# Patient Record
Sex: MALE | Race: WHITE | URBAN - METROPOLITAN AREA
[De-identification: names, ages, dates, MRNs, and addresses within clinical notes are randomized per-mention and may not be internally consistent; named-entity substitution may affect disease eponyms.]

---

## 2020-03-20 ENCOUNTER — OFFICE VISIT CONVERTED (OUTPATIENT)
Dept: UROLOGY | Facility: CLINIC | Age: 20
End: 2020-03-20
Attending: NURSE PRACTITIONER

## 2020-03-20 ENCOUNTER — HOSPITAL ENCOUNTER (OUTPATIENT)
Dept: UROLOGY | Facility: CLINIC | Age: 20
Discharge: HOME | End: 2020-03-20
Attending: NURSE PRACTITIONER

## 2020-03-20 LAB
C TRACH RRNA CVX QL NAA+PROBE: NOT DETECTED
N GONORRHOEA DNA SPEC QL NAA+PROBE: NOT DETECTED

## 2020-07-16 ENCOUNTER — OFFICE VISIT CONVERTED (OUTPATIENT)
Dept: UROLOGY | Facility: CLINIC | Age: 20
End: 2020-07-16
Attending: NURSE PRACTITIONER

## 2021-05-13 NOTE — PROGRESS NOTES
Progress Note      Patient Name: Serafin Talamantes   Patient ID: [id number]   Sex: Male   YOB: 2000    Primary Care Provider: Kacy SAINZ    Visit Date: July 16, 2020    Provider: EMELI Wu   Location: [facility]   Location Address: [address] [place] [address], [address]  [place], [place]  [id number]   Location Phone: [phone]          Chief Complaint  · follow up       History Of Present Illness  The patient is a 19 year old /White male , who is here to follow up on frequency and voiding issues. He is here with his mother who has a history of IC. Patient states he is better after antibiotic but has noticed having frequency still when at work. Denies incontinence but has discomfort in distal penis if urge to urinate and has to urinate in order to relieve. Voids every 30 minutes at times. He doesn't seem to have as much of an issue at home. Problems only notice in the past year and admits having problems with anxiety.          stevenson/anette neg. 3/20/20      has family history of ic in mom       Past Medical History  Dysuria         Past Surgical History  Tonsillectomy and adenoidectomy in patient age 12 or over         Allergy List  NO KNOWN DRUG ALLERGIES       Allergies Reconciled  Family Medical History  Hypertension; Cystitis (Chronic); Prostate cancer         Social History  Alcohol; Tobacco (Never)         Review of Systems  · Constitutional  o Denies  o : fever, headache, chills  · Eyes  o Denies  o : eye pain, double vision, blurred vision  · HENT  o Denies  o : sinus problems, sore throat, ear infection  · Cardiovascular  o Denies  o : chest pain, high blood pressure, varicosities  · Respiratory  o Denies  o : shortness of breath, wheezing, frequent cough  · Gastrointestinal  o Denies  o : nausea, vomiting, heartburn, indigestion, abdominal pain  · Genitourinary  o Admits  o : frequency, difficulty voiding at times  o Denies  o : urgency, urinary  "retention, painful urination  · Integument  o Denies  o : rash, itching, boils  · Neurologic  o Denies  o : tingling or numbness, tremors, dizzy spells  · Musculoskeletal  o Denies  o : joint pain, neck pain, back pain  · Endocrine  o Denies  o : cold intolerance, heat intolerance, tired, excessive thirst, sluggish  · Psychiatric  o Admits  o : feels satisfied with life  o Denies  o : severe depression, concerns with hurting themselves  · Heme-Lymph  o Denies  o : swollen glands, blood clotting problems  · Allergic-Immunologic  o Denies  o : sinus allergy symptoms, hay fever      Vitals  Date Time BP Position Site L\R Cuff Size HR RR TEMP (F) WT  HT  BMI kg/m2 BSA m2 O2 Sat HC       07/16/2020 10:31 /64 Sitting    71 - R   214lbs 16oz 6'  2\" 27.6 2.26           Physical Examination  · Constitutional  o Appearance  o : Well nourished, well developed patient in no acute distress. Ambulating without difficulty.  · Respiratory  o Respiratory Effort  o : Breathing is unlabored without accessory muscle use  o Inspection of Chest  o : normal appearance, no retractions  o Auscultation of Lungs  o : normal breath sounds throughout  · Cardiovascular  o Heart  o :   § Auscultation of Heart  § : regular rate and rhythm, no murmurs, gallops or rubs  o Peripheral Vascular System  o : No abnormalities  · Gastrointestinal  o Abdominal Examination  o : Scaphoid abdomen which is non-tender to palpation with normal tone and without rigidity or guarding. Normal bowel sounds. No masses present.  · Genitourinary  o Bladder  o : no abnormalities  o Penis  o : no lesions, circumcised meatal stenosis  o Urethral Meatus  o : no abnormalities  o Scrotum and Scrotal Contents  o :   § Scrotum  § : no abnormalities  § Epididymides  § : no abnormalities  § Testes  § : no abnormalities  · Lymphatic  o Groin  o : No lymphadenopathy present  · Skin and Subcutaneous Tissue  o General Inspection  o : No rashes, lesions or areas of discoloration " present. Skin turgor is normal.  · Neurologic  o Mental Status Examination  o : grossly oriented to person, place and time  o Gait and Station  o : normal gait, able to stand without difficulty  · Psychiatric  o Mood and Affect  o : mood normal, affect appropriate      Figure 1.0: Pain Rating Scale-Estefany         Results  · In-Office Procedures  o Lab procedure  § Automated dipstick urinalysis with microscopy (13982)   § Color Ur: Yellow   § Clarity Ur: Clear   § Glucose Ur Ql Strip: Negative   § Bilirub Ur Ql Strip: Negative   § Ketones Ur Ql Strip: Negative   § Sp Gr Ur Qn: greater than 1.030   § Hgb Ur Ql Strip: Trace-Intact   § pH Ur-LsCnc: 6.0   § Prot Ur Ql Strip: Negative   § Urobilinogen Ur Strip-mCnc: 0.2 E.U./dL   § Nitrite Ur Ql Strip: Negative   § WBC Est Ur Ql Strip: Negative   § RBC UrnS Qn HPF: 0   § WBC UrnS Qn HPF: 0   § Bacteria UrnS Qn HPF: 0   § Crystals UrnS Qn HPF: few crystals   § Epithelial Cells (non renal): 0 /HPF  § Epithelial Cells (renal): 0       Assessment  · Urinary Frequency     788.41/R35.0  · Unspecified urethral stricture, male, meatal     598.9/N35.911      Plan  · Medications  o oxybutynin chloride 5 mg oral tablet extended release 24hr   SIG: take 1 tablet (5 mg) by oral route once daily for 30 days   DISP: (30) tablets with 1 refills  Prescribed on 07/16/2020     o Medications have been Reconciled  o Transition of Care or Provider Policy     patient no longer have discomfort in prostate region. He continues have frequency when at work but not as much of a problem when at home. Has feeling of discomfort distal penis when urge to urinate. he does have meatal stenosis but denies any problems with flow. try oxybutynin and will get back in 3 weeks. if no improvement, will have dr Coates evaluate meatal stenosis and see if needs meatotomy during cysto to check for stricture or ic. copy of ic diet provided and discussed. reassurance provided.             Electronically Signed by:  Megan Patten, APRN -Author on July 16, 2020 11:51:49 AM

## 2021-05-15 VITALS
BODY MASS INDEX: 27.59 KG/M2 | SYSTOLIC BLOOD PRESSURE: 134 MMHG | DIASTOLIC BLOOD PRESSURE: 64 MMHG | HEIGHT: 74 IN | WEIGHT: 215 LBS | HEART RATE: 71 BPM

## 2021-05-15 VITALS
WEIGHT: 215 LBS | HEART RATE: 73 BPM | DIASTOLIC BLOOD PRESSURE: 68 MMHG | SYSTOLIC BLOOD PRESSURE: 125 MMHG | TEMPERATURE: 98 F | HEIGHT: 74 IN | BODY MASS INDEX: 27.59 KG/M2

## 2023-07-30 ENCOUNTER — HOSPITAL ENCOUNTER (EMERGENCY)
Facility: HOSPITAL | Age: 23
Discharge: HOME | End: 2023-07-30
Attending: EMERGENCY MEDICINE | Admitting: EMERGENCY MEDICINE

## 2023-07-30 ENCOUNTER — APPOINTMENT (OUTPATIENT)
Dept: GENERAL RADIOLOGY | Facility: HOSPITAL | Age: 23
End: 2023-07-30

## 2023-07-30 VITALS
TEMPERATURE: 98.5 F | OXYGEN SATURATION: 99 % | WEIGHT: 220.9 LBS | BODY MASS INDEX: 28.35 KG/M2 | HEIGHT: 74 IN | HEART RATE: 90 BPM | RESPIRATION RATE: 18 BRPM | SYSTOLIC BLOOD PRESSURE: 153 MMHG | DIASTOLIC BLOOD PRESSURE: 89 MMHG

## 2023-07-30 DIAGNOSIS — T30.0 BURN: Primary | ICD-10-CM

## 2023-07-30 LAB
ALBUMIN SERPL-MCNC: 4.9 G/DL (ref 3.5–5.2)
ALBUMIN/GLOB SERPL: 1.9 G/DL
ALP SERPL-CCNC: 75 U/L (ref 39–117)
ALT SERPL W P-5'-P-CCNC: 20 U/L (ref 1–41)
ANION GAP SERPL CALCULATED.3IONS-SCNC: 11.4 MMOL/L (ref 5–15)
AST SERPL-CCNC: 18 U/L (ref 1–40)
BASOPHILS # BLD AUTO: 0.05 10*3/MM3 (ref 0–0.2)
BASOPHILS NFR BLD AUTO: 0.5 % (ref 0–1.5)
BILIRUB SERPL-MCNC: 0.4 MG/DL (ref 0–1.2)
BUN SERPL-MCNC: 16 MG/DL (ref 6–20)
BUN/CREAT SERPL: 14.3 (ref 7–25)
CALCIUM SPEC-SCNC: 9.8 MG/DL (ref 8.6–10.5)
CHLORIDE SERPL-SCNC: 101 MMOL/L (ref 98–107)
CO2 SERPL-SCNC: 27.6 MMOL/L (ref 22–29)
CREAT SERPL-MCNC: 1.12 MG/DL (ref 0.76–1.27)
DEPRECATED RDW RBC AUTO: 38.6 FL (ref 37–54)
EGFRCR SERPLBLD CKD-EPI 2021: 95.3 ML/MIN/1.73
EOSINOPHIL # BLD AUTO: 0.19 10*3/MM3 (ref 0–0.4)
EOSINOPHIL NFR BLD AUTO: 1.8 % (ref 0.3–6.2)
ERYTHROCYTE [DISTWIDTH] IN BLOOD BY AUTOMATED COUNT: 12.2 % (ref 12.3–15.4)
GLOBULIN UR ELPH-MCNC: 2.6 GM/DL
GLUCOSE SERPL-MCNC: 118 MG/DL (ref 65–99)
HCT VFR BLD AUTO: 42.3 % (ref 37.5–51)
HGB BLD-MCNC: 14.7 G/DL (ref 13–17.7)
HOLD SPECIMEN: NORMAL
HOLD SPECIMEN: NORMAL
IMM GRANULOCYTES # BLD AUTO: 0.04 10*3/MM3 (ref 0–0.05)
IMM GRANULOCYTES NFR BLD AUTO: 0.4 % (ref 0–0.5)
LYMPHOCYTES # BLD AUTO: 2.03 10*3/MM3 (ref 0.7–3.1)
LYMPHOCYTES NFR BLD AUTO: 19.2 % (ref 19.6–45.3)
MCH RBC QN AUTO: 30.2 PG (ref 26.6–33)
MCHC RBC AUTO-ENTMCNC: 34.8 G/DL (ref 31.5–35.7)
MCV RBC AUTO: 86.9 FL (ref 79–97)
MONOCYTES # BLD AUTO: 0.79 10*3/MM3 (ref 0.1–0.9)
MONOCYTES NFR BLD AUTO: 7.5 % (ref 5–12)
NEUTROPHILS NFR BLD AUTO: 7.48 10*3/MM3 (ref 1.7–7)
NEUTROPHILS NFR BLD AUTO: 70.6 % (ref 42.7–76)
NRBC BLD AUTO-RTO: 0 /100 WBC (ref 0–0.2)
PLATELET # BLD AUTO: 196 10*3/MM3 (ref 140–450)
PMV BLD AUTO: 11.2 FL (ref 6–12)
POTASSIUM SERPL-SCNC: 3.7 MMOL/L (ref 3.5–5.2)
PROT SERPL-MCNC: 7.5 G/DL (ref 6–8.5)
RBC # BLD AUTO: 4.87 10*6/MM3 (ref 4.14–5.8)
SODIUM SERPL-SCNC: 140 MMOL/L (ref 136–145)
WBC NRBC COR # BLD: 10.58 10*3/MM3 (ref 3.4–10.8)
WHOLE BLOOD HOLD COAG: NORMAL
WHOLE BLOOD HOLD SPECIMEN: NORMAL

## 2023-07-30 PROCEDURE — 85025 COMPLETE CBC W/AUTO DIFF WBC: CPT | Performed by: EMERGENCY MEDICINE

## 2023-07-30 PROCEDURE — 99283 EMERGENCY DEPT VISIT LOW MDM: CPT

## 2023-07-30 PROCEDURE — 73610 X-RAY EXAM OF ANKLE: CPT

## 2023-07-30 PROCEDURE — 80053 COMPREHEN METABOLIC PANEL: CPT | Performed by: EMERGENCY MEDICINE

## 2023-07-30 PROCEDURE — 36415 COLL VENOUS BLD VENIPUNCTURE: CPT | Performed by: EMERGENCY MEDICINE

## 2023-07-30 RX ORDER — DICLOFENAC SODIUM 75 MG/1
75 TABLET, DELAYED RELEASE ORAL 2 TIMES DAILY
Qty: 20 TABLET | Refills: 0 | Status: SHIPPED | OUTPATIENT
Start: 2023-07-30

## 2023-07-30 RX ORDER — CEPHALEXIN 250 MG/1
500 CAPSULE ORAL ONCE
Status: COMPLETED | OUTPATIENT
Start: 2023-07-30 | End: 2023-07-30

## 2023-07-30 RX ORDER — CEPHALEXIN 500 MG/1
500 CAPSULE ORAL 3 TIMES DAILY
Qty: 21 CAPSULE | Refills: 0 | Status: SHIPPED | OUTPATIENT
Start: 2023-07-30 | End: 2023-08-06

## 2023-07-30 RX ORDER — GINSENG 100 MG
1 CAPSULE ORAL ONCE
Status: COMPLETED | OUTPATIENT
Start: 2023-07-30 | End: 2023-07-30

## 2023-07-30 RX ADMIN — BACITRACIN 0.9 G: 500 OINTMENT TOPICAL at 05:47

## 2023-07-30 RX ADMIN — CEPHALEXIN 500 MG: 250 CAPSULE ORAL at 05:47
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
## 2023-07-30 NOTE — Clinical Note
[facility] EMERGENCY ROOM  [address]  [place] [address]  Phone: [phone]    Serafin Talamantes was seen and treated in our emergency department on 7/30/2023.  He may return to work on 07/31/2023.         Thank you for choosing [facility].    Sandy Lee APRN

## 2023-07-30 NOTE — DISCHARGE INSTRUCTIONS
Keep the area clean and dry.  You may cover with an antibiotic ointment for the next 24 to 48 hours after that it will not be necessary.  Keep covered while at work.  Watch for any increased signs of infection such as increased redness, drainage, or swelling.  Take your meds as prescribed.  Complete the antibiotics.  You may take over-the-counter acetaminophen with these medications as needed for pain.  Return to the emergency department for any acutely worsening swelling, any increase in redness outside the marked lines, any fevers 101 or greater, any inability to flex or extend your ankle or any new or worse concerns.